# Patient Record
Sex: FEMALE | NOT HISPANIC OR LATINO | URBAN - METROPOLITAN AREA
[De-identification: names, ages, dates, MRNs, and addresses within clinical notes are randomized per-mention and may not be internally consistent; named-entity substitution may affect disease eponyms.]

---

## 2023-10-11 ENCOUNTER — OFFICE VISIT (OUTPATIENT)
Dept: URGENT CARE | Facility: CLINIC | Age: 23
End: 2023-10-11
Payer: COMMERCIAL

## 2023-10-11 VITALS
OXYGEN SATURATION: 98 % | HEART RATE: 104 BPM | TEMPERATURE: 99.5 F | WEIGHT: 180 LBS | RESPIRATION RATE: 14 BRPM | DIASTOLIC BLOOD PRESSURE: 88 MMHG | SYSTOLIC BLOOD PRESSURE: 129 MMHG

## 2023-10-11 DIAGNOSIS — R10.84 GENERALIZED ABDOMINAL PAIN: ICD-10-CM

## 2023-10-11 DIAGNOSIS — M54.2 NECK PAIN: ICD-10-CM

## 2023-10-11 DIAGNOSIS — K52.9 GASTROENTERITIS: Primary | ICD-10-CM

## 2023-10-11 LAB
POC APPEARANCE, URINE: CLEAR
POC BILIRUBIN, URINE: NEGATIVE
POC BLOOD, URINE: NEGATIVE
POC COLOR, URINE: YELLOW
POC GLUCOSE, URINE: NEGATIVE MG/DL
POC KETONES, URINE: ABNORMAL MG/DL
POC LEUKOCYTES, URINE: NEGATIVE
POC NITRITE,URINE: NEGATIVE
POC PH, URINE: 8.5 PH
POC PROTEIN, URINE: ABNORMAL MG/DL
POC SPECIFIC GRAVITY, URINE: 1.02
POC UROBILINOGEN, URINE: 1 EU/DL

## 2023-10-11 PROCEDURE — 81002 URINALYSIS NONAUTO W/O SCOPE: CPT | Performed by: FAMILY MEDICINE

## 2023-10-11 PROCEDURE — 99202 OFFICE O/P NEW SF 15 MIN: CPT | Performed by: FAMILY MEDICINE

## 2023-10-11 PROCEDURE — 1036F TOBACCO NON-USER: CPT | Performed by: FAMILY MEDICINE

## 2023-10-11 RX ORDER — MECLIZINE HYDROCHLORIDE 25 MG/1
25 TABLET ORAL 3 TIMES DAILY PRN
Qty: 15 TABLET | Refills: 0 | Status: SHIPPED | OUTPATIENT
Start: 2023-10-11 | End: 2024-10-10

## 2023-10-11 ASSESSMENT — PAIN SCALES - GENERAL: PAINLEVEL: 2

## 2023-10-11 NOTE — PROGRESS NOTES
24 yo  States 3 days of GI symptoms- vomiting ( last vomited yesterday ) and diarrhea 3-4 times a day watery. Nausea has resolved  Is not eating well but is drinking fluids well  Unsure if fever  No known exposure but did go camping last weekend but noone else sick  Notes vertigo at onset   Also notes neck pain - no injury but thinks she may have slept on it wrong  No headache or arms symptoms    Problem list  Healthy    Meds none     Social- nonsmoker    Exam  Blood pressure 129/88, pulse 104, temperature 37.5 °C (99.5 °F), resp. rate 14, weight 81.6 kg (180 lb), SpO2 98 %.  Looks well  Ears normal  Throat- no redness  Neck no lad  Lungs clear  Abdomen- tenderness bilateral upper abdomen near ribs  Normal bowel sounds  No other tenderness  Neck full range of motion with paraspinal tenderness     Ua- ketones but negative for uti    A/p  GE- viral. cLear liquids and brat diet. Info given. Take meclezine for vertigo or nausea.  Use good handwashing. Go to ED if symptoms worsen  2. Neck pain- use heating pads and gentle stretching  Nadine Richards MD

## 2023-10-11 NOTE — PATIENT INSTRUCTIONS
cLear liquids and brat diet. Take meclezine for vertigo or nausea.  Use good handwashing. Go to ED if symptoms worsen  2. Neck pain- use heating pads and gentle stretching